# Patient Record
(demographics unavailable — no encounter records)

---

## 2024-11-08 NOTE — HISTORY OF PRESENT ILLNESS
[Sneakers] : mckenna [FreeTextEntry1] : Patient presents today for follow-up and cutting of elongated, thickened nails that he cannot care for himself.  He denies any trauma to the nails.  Denies any changes in his medical histories or medications.  He is also complaining of pain on the right foot on the lateral aspect of the foot.  Patient wears a [place] type shoe daily; has not changed shoe gear.  Denies any intermittent claudication or additional pedal complaints.  Denies any tingling or numbness in his feet.  He states that the tenderness on the outside of his right foot is intermittent; it comes and goes and has been present on and off for several years despite palliative care.

## 2024-11-08 NOTE — PHYSICAL EXAM
[Varicose Veins Of Lower Extremities] : bilaterally [2+] : left foot dorsalis pedis 2+ [Sensation] : the sensory exam was normal to light touch and pinprick [No Focal Deficits] : no focal deficits [Deep Tendon Reflexes (DTR)] : deep tendon reflexes were 2+ and symmetric [Motor Exam] : the motor exam was normal [FreeTextEntry3] : CFT: 3 seconds x 10.  Temperature gradient: warm to cool.  [de-identified] : HAV deformity bilaterally with forefoot varus fully compensated and medial collapse of the foot bilateral. There is hammertoe deformities 2 to 5 bilateral. [FreeTextEntry1] : Negative edema, erythema, or interdigital macerations.  There is plantar scaling on the plantar aspect of the left foot, which has improved compared to the previous visit.  All nails are discolored, elongated, brittle with subungual debris and tenderness on applied pressure x 10.  There is incurvating nail borders on the lateral aspects of bilateral hallux nails with negative signs of acute bacterial infection.  There is an ingrowing nail noted distally but no purulence, fluctuance or drainage.  There is a preulcerative hyperkeratotic lesion noted on the lateral aspect of the 5th met., right foot with negative foreign body, thrombosed vesicles or ulcerations.  Negative calor.  There is tenderness on applied pressure to the area.  Negative pain on palpation along the 5th metatarsal shaft or head.  Negative pain on ROM of the 5th MPJ.  Tailor's bunion noted on the right foot.

## 2024-11-08 NOTE — HISTORY OF PRESENT ILLNESS
[Sneakers] : mckenna [FreeTextEntry1] : Patient presents today for follow-up and cutting of elongated, thickened nails that he cannot care for himself.  He denies any trauma to the nails.  Denies any changes in his medical histories or medications.  He is also complaining of pain on the right foot on the lateral aspect of the foot.  Patient wears a [place] type shoe daily; has not changed shoe gear.  Denies any intermittent claudication or additional pedal complaints.  Denies any tingling or numbness in his feet.  He states that the tenderness on the outside of his right foot is intermittent; it comes and goes and has been present on and off for several years despite palliative care.

## 2024-11-08 NOTE — PHYSICAL EXAM
[Varicose Veins Of Lower Extremities] : bilaterally [2+] : left foot dorsalis pedis 2+ [Sensation] : the sensory exam was normal to light touch and pinprick [No Focal Deficits] : no focal deficits [Deep Tendon Reflexes (DTR)] : deep tendon reflexes were 2+ and symmetric [Motor Exam] : the motor exam was normal [FreeTextEntry3] : CFT: 3 seconds x 10.  Temperature gradient: warm to cool.  [de-identified] : HAV deformity bilaterally with forefoot varus fully compensated and medial collapse of the foot bilateral. There is hammertoe deformities 2 to 5 bilateral. [FreeTextEntry1] : Negative edema, erythema, or interdigital macerations.  There is plantar scaling on the plantar aspect of the left foot, which has improved compared to the previous visit.  All nails are discolored, elongated, brittle with subungual debris and tenderness on applied pressure x 10.  There is incurvating nail borders on the lateral aspects of bilateral hallux nails with negative signs of acute bacterial infection.  There is an ingrowing nail noted distally but no purulence, fluctuance or drainage.  There is a preulcerative hyperkeratotic lesion noted on the lateral aspect of the 5th met., right foot with negative foreign body, thrombosed vesicles or ulcerations.  Negative calor.  There is tenderness on applied pressure to the area.  Negative pain on palpation along the 5th metatarsal shaft or head.  Negative pain on ROM of the 5th MPJ.  Tailor's bunion noted on the right foot.

## 2024-11-08 NOTE — ASSESSMENT
[FreeTextEntry1] : Impression: Painful onychomycosis (B35.2).  Pain in toes (M79.674, M79.675).  Tailor's bunion, right (M21.621).  Capsulitis, right foot (M77.8).  Pain in foot (M79.671).  Treatment: Discussed findings and conditions with the patient.  Patient declined antifungal nail therapy treatment.  All nails were prepped and manually debrided with sterile nippers.  Nails were decreased in height and smoothed with a rotary flakita.  Gently curettage the subungual debris.  The offending portions of nails of bilateral hallux nails, fibular borders were removed via distal slant backs.  Nails were decreased in height and smoothed with a rotary flakita.   The lesion, submet. 5 was prepped and trimmed with a sterile #15 blade and enucleated without incident.  Patient was dispensed a gel Tailor's bunion shield guard, which can be worn daily and with shoes.  He tried it on and stated that he felt comfortable.  To hopefully prevent recurrence of the painful lesion.  Also, discussed shoe gear modifications, rounder toebox and possibly widening the forefoot of the shoe with a shoe stretch to help accommodate his foot type.   Returns: As needed.  With any redness, pain, problems or concerns, patient is to contact the office.

## 2024-11-08 NOTE — PHYSICAL EXAM
[Varicose Veins Of Lower Extremities] : bilaterally [2+] : left foot dorsalis pedis 2+ [Sensation] : the sensory exam was normal to light touch and pinprick [No Focal Deficits] : no focal deficits [Deep Tendon Reflexes (DTR)] : deep tendon reflexes were 2+ and symmetric [Motor Exam] : the motor exam was normal [FreeTextEntry3] : CFT: 3 seconds x 10.  Temperature gradient: warm to cool.  [de-identified] : HAV deformity bilaterally with forefoot varus fully compensated and medial collapse of the foot bilateral. There is hammertoe deformities 2 to 5 bilateral. [FreeTextEntry1] : Negative edema, erythema, or interdigital macerations.  There is plantar scaling on the plantar aspect of the left foot, which has improved compared to the previous visit.  All nails are discolored, elongated, brittle with subungual debris and tenderness on applied pressure x 10.  There is incurvating nail borders on the lateral aspects of bilateral hallux nails with negative signs of acute bacterial infection.  There is an ingrowing nail noted distally but no purulence, fluctuance or drainage.  There is a preulcerative hyperkeratotic lesion noted on the lateral aspect of the 5th met., right foot with negative foreign body, thrombosed vesicles or ulcerations.  Negative calor.  There is tenderness on applied pressure to the area.  Negative pain on palpation along the 5th metatarsal shaft or head.  Negative pain on ROM of the 5th MPJ.  Tailor's bunion noted on the right foot.

## 2024-11-08 NOTE — HISTORY OF PRESENT ILLNESS
[Sneakers] : mckenna [FreeTextEntry1] : Patient presents today for follow-up and cutting of elongated, thickened nails that he cannot care for himself.  He denies any trauma to the nails.  Denies any changes in his medical histories or medications.  He is also complaining of pain on the right foot on the lateral aspect of the foot.  Patient wears a [place] type shoe daily; has not changed shoe gear.  Denies any intermittent claudication or additional pedal complaints.  Denies any tingling or numbness in his feet.  He states that the tenderness on the outside of his right foot is intermittent; it comes and goes and has been present on and off for several years despite palliative care.

## 2025-02-07 NOTE — HISTORY OF PRESENT ILLNESS
[FreeTextEntry1] : Patient presents today with chief complaint of painful, elongated, thickened nails which he states he cannot cut himself. He denies any changes in his medical conditions or medications. He states that he changed his shoes into a slip-on type [employer] [place], which feels comfortable and helps decrease the pain on the lateral aspect of his right and left foot along with the tailor's bunion area. He denies any intermittent claudication type symptoms. He denies any other pedal complaints at this time.

## 2025-02-07 NOTE — HISTORY OF PRESENT ILLNESS
[FreeTextEntry1] : Patient presents today with chief complaint of painful, elongated, thickened nails which he states he cannot cut himself. He denies any changes in his medical conditions or medications. He states that he changed his shoes into a slip-on type [place], which feels comfortable and helps decrease the pain on the lateral aspect of his right and left foot along with the tailor's bunion area. He denies any intermittent claudication type symptoms. He denies any other pedal complaints at this time.

## 2025-02-07 NOTE — ASSESSMENT
[FreeTextEntry1] : Impression: Onychomycosis. Pain in toes.  Treatment: Discussed findings and conditions with the patient along with pedal care. He is to avoid walking barefoot and stay with his comfortable [place] type shoe. Avoid walking with flat slippers in the house to help off-load the pressure areas of the tailor's bunion and submet. 5 bilaterally. All nails were prepped and manually debrided with sterile nippers. Subungual debris was curettaged and the height of the nails were decreased and smoothed with a rotary flakita. Offending portions of nails were removed via distal slant-backs, bilateral hallux incurvating nail borders were removed without incidence and to prevent any further risk of infection. Areas were cleansed with normal saline and antibiotic ointment was applied to the area. The lesion submet. 5 bilaterally were prepped and trimmed and IPK's were enucleated without incidence. He is to continue with the tailor's bunion shield as needed and continue with the shoe gear to prevent any recurrence of painful lesions. Patient is to return in approximately 3 months. Any redness, pain, problems, or concerns, she is to contact the office.

## 2025-02-07 NOTE — PHYSICAL EXAM
[Varicose Veins Of Lower Extremities] : bilaterally [2+] : left foot dorsalis pedis 2+ [FreeTextEntry3] : CFT: 3 seconds x 10. Temperature gradient: warm to cool. [de-identified] : HAV deformity bilaterally and forefoot varus, fully compensated. There is a tailor's bunion bilaterally with negative pain on palpation at this time of the 5th metatarsal head. Negative pain on ROM of the 5th MPJ's or on palpation on the 5th metatarsal shafts.  [FreeTextEntry1] : Negative edema, erythema or interdigital macerations.  Plantar scaling has much improved and resolved since the previous visit. All nails are elongated, brittle with subungual debris and pain on applied pressure x10. There is incurvating nail borders on the medial and lateral aspects of bilateral hallux. Negative signs of acute bacterial infection, fluctuance, drainage, calor, or granulomas. There are hyperkeratotic lesions with IPK's which are minimal at this time submet. 5 bilaterally and some hyperkeratotic lesion noted at the IPJ of the left hallux. Negative ulcerations, breaks in skin, portals of entry or thrombosed vesicles. [Sensation] : the sensory exam was normal to light touch and pinprick [No Focal Deficits] : no focal deficits [Deep Tendon Reflexes (DTR)] : deep tendon reflexes were 2+ and symmetric [Motor Exam] : the motor exam was normal

## 2025-02-07 NOTE — PHYSICAL EXAM
[Varicose Veins Of Lower Extremities] : bilaterally [2+] : left foot dorsalis pedis 2+ [FreeTextEntry3] : CFT: 3 seconds x 10. Temperature gradient: warm to cool. [de-identified] : HAV deformity bilaterally and forefoot varus, fully compensated. There is a tailor's bunion bilaterally with negative pain on palpation at this time of the 5th metatarsal head. Negative pain on ROM of the 5th MPJ's or on palpation on the 5th metatarsal shafts.  [FreeTextEntry1] : Negative edema, erythema or interdigital macerations.  Plantar scaling has much improved and resolved since the previous visit. All nails are elongated, brittle with subungual debris and pain on applied pressure x10. There is incurvating nail borders on the medial and lateral aspects of bilateral hallux. Negative signs of acute bacterial infection, fluctuance, drainage, calor, or granulomas. There are hyperkeratotic lesions with IPK's which are minimal at this time submet. 5 bilaterally and some hyperkeratotic lesion noted at the IPJ of the left hallux. Negative ulcerations, breaks in skin, portals of entry or thrombosed vesicles. [Sensation] : the sensory exam was normal to light touch and pinprick [No Focal Deficits] : no focal deficits [Deep Tendon Reflexes (DTR)] : deep tendon reflexes were 2+ and symmetric [Motor Exam] : the motor exam was normal

## 2025-02-07 NOTE — ASSESSMENT
[FreeTextEntry1] : Impression: Onychomycosis. Pain in toes.  Treatment: Discussed findings and conditions with the patient along with pedal care. He is to avoid walking barefoot and stay with his comfortable [place] type shoe. Avoid walking with flat slippers in the house to help off-load the pressure areas of the tailor's bunion and submet. 5 bilaterally. All nails were prepped and manually debrided with sterile nippers. Subungual debris was curettaged and the height of the nails were decreased and smoothed with a rotary flakita. Offending portions of nails were removed via distal slant-backs, bilateral hallux incurvating nail borders were removed without incidence and to prevent any further risk of infection. Areas were cleansed with normal saline and antibiotic ointment was applied to the area. The lesion submet. 5 bilaterally were prepped and trimmed and IPK's were enucleated without incidence. He is to continue with the tailor's bunion shield as needed and continue with the shoe gear to prevent any recurrence of painful lesions. Patient is to return in approximately 3 months. Any redness, pain, problems, or concerns, she is to contact the office.

## 2025-02-07 NOTE — ASSESSMENT
[FreeTextEntry1] : Impression: Onychomycosis. Pain in toes.  Treatment: Discussed findings and conditions with the patient along with pedal care. He is to avoid walking barefoot and stay with his comfortable [place] type shoe. Avoid walking with flat slippers in the house to help off-load the pressure areas of the tailor's bunion and submet. 5 bilaterally. All nails were prepped and manually debrided with sterile nippers. Subungual debris was curettaged and the height of the nails were decreased and smoothed with a rotary flakita. Offending portions of nails were removed via distal slant-backs, bilateral hallux incurvating nail borders were removed without incidence and to prevent any further risk of infection. Areas were cleansed with normal saline and antibiotic ointment was applied to the area. The lesion submet. 5 bilaterally were prepped and trimmed and IPK's were enucleated without incidence. He is to continue with the tailor's bunion shield as needed and continue with the shoe gear to prevent any recurrence of painful lesions. Patient is to return in approximately 3 months. Any redness, pain, problems, or concerns, she is to contact the office.

## 2025-02-07 NOTE — PHYSICAL EXAM
[Varicose Veins Of Lower Extremities] : bilaterally [2+] : left foot dorsalis pedis 2+ [FreeTextEntry3] : CFT: 3 seconds x 10. Temperature gradient: warm to cool. [de-identified] : HAV deformity bilaterally and forefoot varus, fully compensated. There is a tailor's bunion bilaterally with negative pain on palpation at this time of the 5th metatarsal head. Negative pain on ROM of the 5th MPJ's or on palpation on the 5th metatarsal shafts.  [FreeTextEntry1] : Negative edema, erythema or interdigital macerations.  Plantar scaling has much improved and resolved since the previous visit. All nails are elongated, brittle with subungual debris and pain on applied pressure x10. There is incurvating nail borders on the medial and lateral aspects of bilateral hallux. Negative signs of acute bacterial infection, fluctuance, drainage, calor, or granulomas. There are hyperkeratotic lesions with IPK's which are minimal at this time submet. 5 bilaterally and some hyperkeratotic lesion noted at the IPJ of the left hallux. Negative ulcerations, breaks in skin, portals of entry or thrombosed vesicles. [Sensation] : the sensory exam was normal to light touch and pinprick [No Focal Deficits] : no focal deficits [Deep Tendon Reflexes (DTR)] : deep tendon reflexes were 2+ and symmetric [Motor Exam] : the motor exam was normal

## 2025-02-07 NOTE — HISTORY OF PRESENT ILLNESS
[FreeTextEntry1] : Patient presents today with chief complaint of painful, elongated, thickened nails which he states he cannot cut himself. He denies any changes in his medical conditions or medications. He states that he changed his shoes into a slip-on type [place], which feels comfortable and helps decrease the pain on the lateral aspect of his right and left foot along with the tailor's bunion area. He denies any intermittent claudication type symptoms. He denies any other pedal complaints at this time.

## 2025-05-05 NOTE — PROCEDURE
[Injection] : Injection [Right] : of the right [Knee Joint] : knee joint [Inflammation] : Inflammation [Patient] : patient [Risk] : risk [Benefits] : benefits [Alternatives] : alternatives [Bleeding] : bleeding [Infection] : infection [Allergic Reaction] : allergic reaction [Verbal Consent Obtained] : verbal consent was obtained prior to the procedure [Alcohol] : Alcohol [Ethyl Chloride Spray] : ethyl chloride spray was used as a topical anesthetic [Medial] : medial [Anterior] : anterior [25] : a 25-gauge [1% Lidocaine___(mL)] : [unfilled] mL of 1% Lidocaine [Methylpred. 40mg/mL___(mL)] : [unfilled] mL of 40mg/mL methylprednisolone [Bandage Applied] : a bandage [Tolerated Well] : The patient tolerated the procedure well [None] : none

## 2025-05-05 NOTE — END OF VISIT
[Time Spent: ___ minutes] : I have spent [unfilled] minutes of time on the encounter which excludes teaching and separately reported services. Pfizer dose 1 and 2

## 2025-05-09 NOTE — DISCUSSION/SUMMARY
[Medication Risks Reviewed] : Medication risks reviewed [Surgical risks reviewed] : Surgical risks reviewed [de-identified] : After a thorough history, full evaluation and review of x-rays.  It was explained to the patient that there is osteoarthritic changes in the right knee.  The patient was explained the different modalities of treatment which include conservative versus surgical intervention.  The patient was offered an injection here in the office today for the right knee.  Prior to the injection. The patient was explained the risks, benefits, pros and cons, as well as alternatives.  It was explained that there is no guarantee for relief following the injection; and that this is not a cure for the osteoarthritis.  It was also explained to the patient that if there is any relief, this may be short-lived, as the pain may return.  This was explained in great detail in which the patient fully understood and agreed to the injection.  Therefore, it was given under sterile conditions without any complications.    The patient was also advised to continue with conservative management.  This includes a good exercise program consisting of isometric,range of motion and strengthening exercises. It is also advised to continue with ice packs, moist heat and pain medications in the form of anti-inflammatories.  The patient is advised to continue with these conservative measures and to return back to the office in another month or 2 for reevaluation and management.  However, if there is any increase in pain, redness, swelling, heat, discharge or fever.  The patient is advised to notify the office immediately.  35 minutes were spent, face to face, in direct consultation with the patient. This includes reviewing the natural history of their Dx., eliciting the history, performing an orthopedic exam, review of the x-ray findings, forming a differential Dx and discussing all treatment options. This Includes both surgical and non-surgical treatments. I also reviewed all the risks and benefits of non-operative & operative Tx options, future impact into orthopedic functions/problems, activity restrictions both at home and at work, and all follow up requirements.

## 2025-05-09 NOTE — HISTORY OF PRESENT ILLNESS
[de-identified] : Pt presents today for an injection for the right knee The pt has been seen before and explained that there is osteoarthritis in the right knee. Although it is recommended that the patient continue conservation management. It was also advised to try an intra articular injection in the right knee should the pain continue or worsen. The pt states that the pain still persists with daily activities and can be worse at times. Including walking standing and climbing up and down the stairs. Therefore, the pt agreed to return for the injection. There is no new reported injury or accident.

## 2025-05-09 NOTE — PHYSICAL EXAM
[Antalgic] : antalgic [LE] : Sensory: Intact in bilateral lower extremities [ALL] : dorsalis pedis, posterior tibial, femoral, popliteal, and radial 2+ and symmetric bilaterally [de-identified] : Physical examination of the knee the skin is clean, dry and intact. There is no evidence of infection; superficial or deep. There is diffuse pain and tenderness in all3 compartments. The pt is neuro vascularly intact with good distal pulses.  The injection was administered while the patient was in a seated position.  Patient tolerated the injection well without any complications. [de-identified] :  No x-rays were performed in today's office visit.

## 2025-05-09 NOTE — PHYSICAL EXAM
[Antalgic] : antalgic [LE] : Sensory: Intact in bilateral lower extremities [ALL] : dorsalis pedis, posterior tibial, femoral, popliteal, and radial 2+ and symmetric bilaterally [de-identified] : Physical examination of the knee the skin is clean, dry and intact. There is no evidence of infection; superficial or deep. There is diffuse pain and tenderness in all3 compartments. The pt is neuro vascularly intact with good distal pulses.  The injection was administered while the patient was in a seated position.  Patient tolerated the injection well without any complications. [de-identified] :  No x-rays were performed in today's office visit.

## 2025-05-09 NOTE — HISTORY OF PRESENT ILLNESS
[de-identified] : Pt presents today for an injection for the right knee The pt has been seen before and explained that there is osteoarthritis in the right knee. Although it is recommended that the patient continue conservation management. It was also advised to try an intra articular injection in the right knee should the pain continue or worsen. The pt states that the pain still persists with daily activities and can be worse at times. Including walking standing and climbing up and down the stairs. Therefore, the pt agreed to return for the injection. There is no new reported injury or accident.

## 2025-05-09 NOTE — HISTORY OF PRESENT ILLNESS
[de-identified] : Pt presents today for an injection for the right knee The pt has been seen before and explained that there is osteoarthritis in the right knee. Although it is recommended that the patient continue conservation management. It was also advised to try an intra articular injection in the right knee should the pain continue or worsen. The pt states that the pain still persists with daily activities and can be worse at times. Including walking standing and climbing up and down the stairs. Therefore, the pt agreed to return for the injection. There is no new reported injury or accident.

## 2025-05-09 NOTE — DISCUSSION/SUMMARY
[Medication Risks Reviewed] : Medication risks reviewed [Surgical risks reviewed] : Surgical risks reviewed [de-identified] : After a thorough history, full evaluation and review of x-rays.  It was explained to the patient that there is osteoarthritic changes in the right knee.  The patient was explained the different modalities of treatment which include conservative versus surgical intervention.  The patient was offered an injection here in the office today for the right knee.  Prior to the injection. The patient was explained the risks, benefits, pros and cons, as well as alternatives.  It was explained that there is no guarantee for relief following the injection; and that this is not a cure for the osteoarthritis.  It was also explained to the patient that if there is any relief, this may be short-lived, as the pain may return.  This was explained in great detail in which the patient fully understood and agreed to the injection.  Therefore, it was given under sterile conditions without any complications.    The patient was also advised to continue with conservative management.  This includes a good exercise program consisting of isometric,range of motion and strengthening exercises. It is also advised to continue with ice packs, moist heat and pain medications in the form of anti-inflammatories.  The patient is advised to continue with these conservative measures and to return back to the office in another month or 2 for reevaluation and management.  However, if there is any increase in pain, redness, swelling, heat, discharge or fever.  The patient is advised to notify the office immediately.  35 minutes were spent, face to face, in direct consultation with the patient. This includes reviewing the natural history of their Dx., eliciting the history, performing an orthopedic exam, review of the x-ray findings, forming a differential Dx and discussing all treatment options. This Includes both surgical and non-surgical treatments. I also reviewed all the risks and benefits of non-operative & operative Tx options, future impact into orthopedic functions/problems, activity restrictions both at home and at work, and all follow up requirements.

## 2025-05-09 NOTE — DISCUSSION/SUMMARY
[Medication Risks Reviewed] : Medication risks reviewed [Surgical risks reviewed] : Surgical risks reviewed [de-identified] : After a thorough history, full evaluation and review of x-rays.  It was explained to the patient that there is osteoarthritic changes in the right knee.  The patient was explained the different modalities of treatment which include conservative versus surgical intervention.  The patient was offered an injection here in the office today for the right knee.  Prior to the injection. The patient was explained the risks, benefits, pros and cons, as well as alternatives.  It was explained that there is no guarantee for relief following the injection; and that this is not a cure for the osteoarthritis.  It was also explained to the patient that if there is any relief, this may be short-lived, as the pain may return.  This was explained in great detail in which the patient fully understood and agreed to the injection.  Therefore, it was given under sterile conditions without any complications.    The patient was also advised to continue with conservative management.  This includes a good exercise program consisting of isometric,range of motion and strengthening exercises. It is also advised to continue with ice packs, moist heat and pain medications in the form of anti-inflammatories.  The patient is advised to continue with these conservative measures and to return back to the office in another month or 2 for reevaluation and management.  However, if there is any increase in pain, redness, swelling, heat, discharge or fever.  The patient is advised to notify the office immediately.  35 minutes were spent, face to face, in direct consultation with the patient. This includes reviewing the natural history of their Dx., eliciting the history, performing an orthopedic exam, review of the x-ray findings, forming a differential Dx and discussing all treatment options. This Includes both surgical and non-surgical treatments. I also reviewed all the risks and benefits of non-operative & operative Tx options, future impact into orthopedic functions/problems, activity restrictions both at home and at work, and all follow up requirements.

## 2025-05-09 NOTE — REASON FOR VISIT
[Follow-Up Visit] : a follow-up visit for [Knee Pain] : knee pain [FreeTextEntry2] : Patient presents for evaluation of left knee pain.

## 2025-05-09 NOTE — PHYSICAL EXAM
After Visit Summary   4/24/2018    Any Kohli    MRN: [id number]           Patient Information     Date Of Birth          2008        Visit Information        Provider Department      4/24/2018 3:30 PM Deo Villatoro MD [facility]        Today's Diagnoses     Abnormal gait    -  1       Follow-ups after your visit        Additional Services     ORTHOPEDICS PEDS REFERRAL       Your provider has referred you to:  Dr Fabricio August here in [place]   Please be aware that coverage of these services is subject to the terms and limitations of your health insurance plan.  Call member services at your health plan with any benefit or coverage questions.      Please bring the following to your appointment:  >>   Any x-rays, CTs or MRIs which have been performed.  Contact the facility where they were done to arrange for  prior to your scheduled appointment.    >>   List of current medications  >>   This referral request   >>   Any documents/labs given to you for this referral                  Who to contact     If you have questions or need follow up information about today's clinic visit or your schedule please contact [facility] directly at [phone].  Normal or non-critical lab and imaging results will be communicated to you by MyChart, letter or phone within 4 business days after the clinic has received the results. If you do not hear from us within 7 days, please contact the clinic through MyChart or phone. If you have a critical or abnormal lab result, we will notify you by phone as soon as possible.  Submit refill requests through [facility] or call your pharmacy and they will forward the refill request to us. Please allow 3 business days for your refill to be completed.          Additional Information About Your Visit        MyChart Information     [facility] lets you send messages to your doctor, view your test results, renew your prescriptions, schedule  appointments and more. To sign up, go to [email or web address], contact your [place] clinic or call [phone] during business hours.            Care EveryWhere ID     This is your Care EveryWhere ID. This could be used by other organizations to access your [place] medical records  [id number]        Your Vitals Were     Pulse Temperature Pulse Oximetry             88 98.2  F (36.8  C) (Tympanic) 98%          Blood Pressure from Last 3 Encounters:   04/24/18 94/58   04/17/17 100/56   03/08/16 98/68    Weight from Last 3 Encounters:   04/24/18 106 lb (48.1 kg) (96 %)*   04/18/17 92 lb 9.5 oz (42 kg) (96 %)*   04/17/17 90 lb (40.8 kg) (95 %)*     * Growth percentiles are based on [facility] 2-20 Years data.              We Performed the Following     ORTHOPEDICS PEDS REFERRAL        Primary Care Provider Office Phone # Fax #    Deo Villatoro [phone] [address] [address]        Equal Access to Services     [facility]: Hadii veronique Sanders, wachaya nguyễn, qaleona oleary, chalino brice . So [facility] [phone].    ATENCIÓN: Si habla español, tiene a rosales disposición servicios gratuitos de asistencia lingüística. [facility] [phone].    We comply with applicable federal civil rights laws and [place] laws. We do not discriminate on the basis of race, color, national origin, age, disability, sex, sexual orientation, or gender identity.            Thank you!     Thank you for choosing [facility]  for your care. Our goal is always to provide you with excellent care. Hearing back from our patients is one way we can continue to improve our services. Please take a few minutes to complete the written survey that you may receive in the mail after your visit with us. Thank you!             Your Updated Medication List - Protect others around you: Learn how to safely use, store and throw away your medicines at  www.disposemymeds.org.          This list is accurate as of 4/24/18  5:26 PM.  Always use your most recent med list.                   Brand Name Dispense Instructions for use Diagnosis    ZYRTEC ALLERGY PO              [Antalgic] : antalgic [LE] : Sensory: Intact in bilateral lower extremities [ALL] : dorsalis pedis, posterior tibial, femoral, popliteal, and radial 2+ and symmetric bilaterally [de-identified] : Physical examination of the knee the skin is clean, dry and intact. There is no evidence of infection; superficial or deep. There is diffuse pain and tenderness in all3 compartments. The pt is neuro vascularly intact with good distal pulses.  The injection was administered while the patient was in a seated position.  Patient tolerated the injection well without any complications. [de-identified] :  No x-rays were performed in today's office visit.